# Patient Record
Sex: FEMALE | Race: BLACK OR AFRICAN AMERICAN | Employment: FULL TIME | URBAN - METROPOLITAN AREA
[De-identification: names, ages, dates, MRNs, and addresses within clinical notes are randomized per-mention and may not be internally consistent; named-entity substitution may affect disease eponyms.]

---

## 2021-03-24 ENCOUNTER — EMPLOYEE HEALTH (OUTPATIENT)
Dept: OTHER | Facility: HOSPITAL | Age: 46
End: 2021-03-24
Attending: PREVENTIVE MEDICINE

## 2021-03-24 DIAGNOSIS — Z11.1 SCREENING-PULMONARY TB: Primary | ICD-10-CM

## 2021-03-24 PROCEDURE — 86480 TB TEST CELL IMMUN MEASURE: CPT

## 2021-03-26 LAB
M TB IFN-G CD4+ T-CELLS BLD-ACNC: 0.07 IU/ML
M TB TUBERC IFN-G BLD QL: NEGATIVE
M TB TUBERC IGNF/MITOGEN IGNF CONTROL: >10 IU/ML
QFT TB1 AG MINUS NIL: 0.02 IU/ML
QFT TB2 AG MINUS NIL: 0.03 IU/ML

## 2021-10-23 ENCOUNTER — LAB ENCOUNTER (OUTPATIENT)
Dept: LAB | Age: 46
End: 2021-10-23
Attending: PREVENTIVE MEDICINE
Payer: COMMERCIAL

## 2021-11-05 ENCOUNTER — LAB ENCOUNTER (OUTPATIENT)
Dept: LAB | Age: 46
End: 2021-11-05
Attending: PREVENTIVE MEDICINE
Payer: COMMERCIAL

## 2021-11-12 ENCOUNTER — LAB ENCOUNTER (OUTPATIENT)
Dept: LAB | Age: 46
End: 2021-11-12
Attending: PREVENTIVE MEDICINE
Payer: COMMERCIAL

## 2021-11-17 ENCOUNTER — LAB ENCOUNTER (OUTPATIENT)
Dept: LAB | Age: 46
End: 2021-11-17
Attending: PREVENTIVE MEDICINE
Payer: COMMERCIAL

## 2021-11-26 ENCOUNTER — LAB ENCOUNTER (OUTPATIENT)
Dept: LAB | Age: 46
End: 2021-11-26
Attending: PREVENTIVE MEDICINE
Payer: COMMERCIAL

## 2021-12-01 ENCOUNTER — LAB ENCOUNTER (OUTPATIENT)
Dept: LAB | Age: 46
End: 2021-12-01
Attending: PREVENTIVE MEDICINE
Payer: COMMERCIAL

## 2021-12-10 ENCOUNTER — LAB ENCOUNTER (OUTPATIENT)
Dept: LAB | Age: 46
End: 2021-12-10
Attending: PREVENTIVE MEDICINE
Payer: COMMERCIAL

## 2021-12-18 ENCOUNTER — LAB ENCOUNTER (OUTPATIENT)
Dept: LAB | Age: 46
End: 2021-12-18
Attending: PREVENTIVE MEDICINE
Payer: COMMERCIAL

## 2021-12-24 ENCOUNTER — LAB ENCOUNTER (OUTPATIENT)
Dept: LAB | Age: 46
End: 2021-12-24
Attending: PREVENTIVE MEDICINE
Payer: COMMERCIAL

## 2022-01-03 ENCOUNTER — LAB ENCOUNTER (OUTPATIENT)
Dept: LAB | Age: 47
End: 2022-01-03
Attending: PREVENTIVE MEDICINE
Payer: COMMERCIAL

## 2022-01-05 LAB — SARS-COV-2 RNA RESP QL NAA+PROBE: DETECTED

## 2022-01-07 ENCOUNTER — TELEPHONE (OUTPATIENT)
Dept: INTERNAL MEDICINE CLINIC | Facility: HOSPITAL | Age: 47
End: 2022-01-07

## 2022-01-07 NOTE — TELEPHONE ENCOUNTER
Department: Housekeeping                                 [x] [facility]  []DANISH   [] [address] [place] [address]    Dept Manager/Supervisor/team or clinical lead: Rimma Warren    Position:  [] MD     [] RN     [] Respiratory Therapist     [] PCT     [] PSR      [] Julito Thao     [ you have close contact with someone on your unit while not wearing a mask? (e.g., during meal breaks):  Yes []   No [x]    If yes, who:   Do you share a workspace? Yes [x]   No []       If yes, with whom?  Coworkers  Do you have any family members sick at h positive test or onset of symptoms (day 0)    [x]      On day 5, if asymptomatic or mildly symptomatic (with improving symptoms) may return to work day 6  [x]      On day 5, if symptomatic, call Employee Health for RTW screening  [] RTW immediately, contin

## 2022-04-20 ENCOUNTER — LAB ENCOUNTER (OUTPATIENT)
Dept: LAB | Age: 47
End: 2022-04-20
Attending: PREVENTIVE MEDICINE
Payer: COMMERCIAL

## 2022-04-20 LAB — SARS-COV-2 RNA RESP QL NAA+PROBE: NOT DETECTED

## 2022-04-29 ENCOUNTER — LAB ENCOUNTER (OUTPATIENT)
Dept: LAB | Age: 47
End: 2022-04-29
Attending: PREVENTIVE MEDICINE
Payer: COMMERCIAL

## 2022-04-30 LAB — SARS-COV-2 RNA RESP QL NAA+PROBE: NOT DETECTED

## 2022-05-06 ENCOUNTER — LAB ENCOUNTER (OUTPATIENT)
Dept: LAB | Age: 47
End: 2022-05-06
Attending: PREVENTIVE MEDICINE
Payer: COMMERCIAL

## 2022-05-07 LAB — SARS-COV-2 RNA RESP QL NAA+PROBE: NOT DETECTED

## 2022-05-13 ENCOUNTER — LAB ENCOUNTER (OUTPATIENT)
Dept: LAB | Age: 47
End: 2022-05-13
Attending: PREVENTIVE MEDICINE
Payer: COMMERCIAL

## 2022-05-14 LAB — SARS-COV-2 RNA RESP QL NAA+PROBE: NOT DETECTED

## 2022-05-19 ENCOUNTER — LAB ENCOUNTER (OUTPATIENT)
Dept: LAB | Age: 47
End: 2022-05-19
Attending: PREVENTIVE MEDICINE
Payer: COMMERCIAL

## 2022-05-20 LAB — SARS-COV-2 RNA RESP QL NAA+PROBE: NOT DETECTED

## 2022-05-27 ENCOUNTER — LAB ENCOUNTER (OUTPATIENT)
Dept: LAB | Age: 47
End: 2022-05-27
Attending: PREVENTIVE MEDICINE
Payer: COMMERCIAL

## 2022-05-28 LAB — SARS-COV-2 RNA RESP QL NAA+PROBE: NOT DETECTED

## 2022-06-02 ENCOUNTER — LAB ENCOUNTER (OUTPATIENT)
Dept: LAB | Age: 47
End: 2022-06-02
Attending: PREVENTIVE MEDICINE
Payer: COMMERCIAL

## 2022-06-03 LAB — SARS-COV-2 RNA RESP QL NAA+PROBE: NOT DETECTED

## 2022-06-11 ENCOUNTER — LAB ENCOUNTER (OUTPATIENT)
Dept: LAB | Age: 47
End: 2022-06-11
Attending: PREVENTIVE MEDICINE
Payer: COMMERCIAL

## 2022-06-12 LAB — SARS-COV-2 RNA RESP QL NAA+PROBE: NOT DETECTED

## 2022-06-17 ENCOUNTER — LAB ENCOUNTER (OUTPATIENT)
Dept: LAB | Age: 47
End: 2022-06-17
Attending: PREVENTIVE MEDICINE
Payer: COMMERCIAL

## 2022-06-18 LAB — SARS-COV-2 RNA RESP QL NAA+PROBE: NOT DETECTED

## 2022-06-25 ENCOUNTER — LAB ENCOUNTER (OUTPATIENT)
Dept: LAB | Age: 47
End: 2022-06-25
Attending: PREVENTIVE MEDICINE
Payer: COMMERCIAL

## 2022-06-26 LAB — SARS-COV-2 RNA RESP QL NAA+PROBE: NOT DETECTED

## 2022-06-26 NOTE — PROGRESS NOTES
Covid - not detected Headache History Follow up    How bad have your headaches been since your last visit?   [x] Improved [] Worsened [] No Change    Since your last visit, how many days of headaches have you had? 10/90      What is the lowest intensity (0-10) headache you have experienced in the last 3 months? 2/10    What is the highest intensity (0-10) headache you have experienced in the last 3 months? 7/10    How many of those days of headaches were severe (intensity of 7 or higher)? 2    What other symptoms do you have with the headache? [x] nausea    [x] vomiting   [x] light sensitivity   [x] sound sensitivity    [] smell sensitivity   [] movement sensitivity  [] other:     How long do your headaches last on average (minutes, hours, days)? 12 hours  What is the shortest duration of your headaches? 2 hours  What is the longest duration of your headaches? 1 days    What triggers the headaches? Lack of sleep, stress and weather, surgery, certain foods, thyroid dose/medication    What medication(s) do you take for the headaches? Imitrex, zofran, botox  When do you take the medication (onset, later...)? onset  Does the medication help the headache? Yes   How long does it take to help the headache? 30 min  Does it completely alleviate or reduce the headache? [x] alleviates   [x] reduces-imitrex  If it only reduces the headache, what % relief do you get? 75%  Does the headache come back and when? Yes    Are you taking all of your medications as prescribed/recommended, including over the counter and supplements/vitamins? yes    Are you having any side effects with your prescription or over the counter medications/supplements/vitamins? no    Do you suffer from other kinds of headaches besides your primary type? [] sinus [] tension   [] other:     Have you had any lifestyle changes since the last office visit? No   [] Skipping Meals [] Caffeine Intake [] Exercise   [] Sleeping  [] Smoking  [] Other  Please explain any items checked above:      How many times have you gone to the ED or Urgent Care for headaches in the past 3 months? none    Have you undergone IV therapy for migraine since the last office visit? No   If yes, how many times and when?:     Are you having any sleep problems? No    Are headaches interfering with? [x] eating    [x] thinking    [x]other daily activities.   If yes, please explain: no eating with headaches and headache decrease concentration.    Do you need any medication refills today? No    HIT & MIDAS DONE 08/12/2021

## 2022-06-30 ENCOUNTER — LAB ENCOUNTER (OUTPATIENT)
Dept: LAB | Age: 47
End: 2022-06-30
Attending: PREVENTIVE MEDICINE
Payer: COMMERCIAL

## 2022-06-30 LAB — SARS-COV-2 RNA RESP QL NAA+PROBE: NOT DETECTED

## 2022-07-08 ENCOUNTER — LAB ENCOUNTER (OUTPATIENT)
Dept: LAB | Age: 47
End: 2022-07-08
Attending: PREVENTIVE MEDICINE
Payer: COMMERCIAL

## 2022-07-09 LAB — SARS-COV-2 RNA RESP QL NAA+PROBE: NOT DETECTED

## 2022-07-14 ENCOUNTER — LAB ENCOUNTER (OUTPATIENT)
Dept: LAB | Age: 47
End: 2022-07-14
Attending: PREVENTIVE MEDICINE
Payer: COMMERCIAL

## 2022-07-15 LAB — SARS-COV-2 RNA RESP QL NAA+PROBE: NOT DETECTED

## 2022-07-23 ENCOUNTER — LAB ENCOUNTER (OUTPATIENT)
Dept: LAB | Age: 47
End: 2022-07-23
Attending: PREVENTIVE MEDICINE
Payer: COMMERCIAL

## 2022-07-24 LAB — SARS-COV-2 RNA RESP QL NAA+PROBE: NOT DETECTED

## 2022-07-30 ENCOUNTER — LAB ENCOUNTER (OUTPATIENT)
Dept: LAB | Age: 47
End: 2022-07-30
Attending: PREVENTIVE MEDICINE
Payer: COMMERCIAL

## 2022-07-31 LAB — SARS-COV-2 RNA RESP QL NAA+PROBE: NOT DETECTED

## 2022-08-06 ENCOUNTER — LAB ENCOUNTER (OUTPATIENT)
Dept: LAB | Age: 47
End: 2022-08-06
Attending: PREVENTIVE MEDICINE
Payer: COMMERCIAL

## 2022-08-07 LAB — SARS-COV-2 RNA RESP QL NAA+PROBE: NOT DETECTED

## 2022-08-12 ENCOUNTER — LAB ENCOUNTER (OUTPATIENT)
Dept: LAB | Age: 47
End: 2022-08-12
Attending: PREVENTIVE MEDICINE
Payer: COMMERCIAL

## 2022-08-13 LAB — SARS-COV-2 RNA RESP QL NAA+PROBE: NOT DETECTED

## 2022-08-19 ENCOUNTER — LAB ENCOUNTER (OUTPATIENT)
Dept: LAB | Age: 47
End: 2022-08-19
Attending: PREVENTIVE MEDICINE
Payer: COMMERCIAL

## 2022-08-20 LAB — SARS-COV-2 RNA RESP QL NAA+PROBE: NOT DETECTED

## 2022-08-25 ENCOUNTER — LAB ENCOUNTER (OUTPATIENT)
Dept: LAB | Age: 47
End: 2022-08-25
Attending: PREVENTIVE MEDICINE
Payer: COMMERCIAL

## 2022-08-26 LAB — SARS-COV-2 RNA RESP QL NAA+PROBE: NOT DETECTED

## 2022-09-03 ENCOUNTER — LAB ENCOUNTER (OUTPATIENT)
Dept: LAB | Age: 47
End: 2022-09-03
Attending: PREVENTIVE MEDICINE
Payer: COMMERCIAL

## 2022-09-04 LAB — SARS-COV-2 RNA RESP QL NAA+PROBE: NOT DETECTED

## 2022-09-08 ENCOUNTER — LAB ENCOUNTER (OUTPATIENT)
Dept: LAB | Age: 47
End: 2022-09-08
Attending: PREVENTIVE MEDICINE

## 2022-09-09 LAB — SARS-COV-2 RNA RESP QL NAA+PROBE: NOT DETECTED

## 2022-09-16 ENCOUNTER — LAB ENCOUNTER (OUTPATIENT)
Dept: LAB | Age: 47
End: 2022-09-16
Attending: PREVENTIVE MEDICINE
Payer: COMMERCIAL

## 2022-09-17 LAB — SARS-COV-2 RNA RESP QL NAA+PROBE: NOT DETECTED

## 2022-09-24 ENCOUNTER — LAB ENCOUNTER (OUTPATIENT)
Dept: LAB | Age: 47
End: 2022-09-24
Attending: PREVENTIVE MEDICINE

## 2022-09-25 LAB — SARS-COV-2 RNA RESP QL NAA+PROBE: NOT DETECTED

## 2022-09-27 ENCOUNTER — OCC HEALTH (OUTPATIENT)
Dept: OTHER | Facility: HOSPITAL | Age: 47
End: 2022-09-27
Attending: PREVENTIVE MEDICINE

## 2022-09-27 RX ORDER — PERMETHRIN 50 MG/G
CREAM TOPICAL
Qty: 60 G | Refills: 0 | Status: SHIPPED | OUTPATIENT
Start: 2022-09-27

## 2022-09-30 ENCOUNTER — LAB ENCOUNTER (OUTPATIENT)
Dept: LAB | Age: 47
End: 2022-09-30
Attending: PREVENTIVE MEDICINE
Payer: COMMERCIAL

## 2022-10-01 LAB — SARS-COV-2 RNA RESP QL NAA+PROBE: NOT DETECTED

## 2022-10-06 ENCOUNTER — LAB ENCOUNTER (OUTPATIENT)
Dept: LAB | Age: 47
End: 2022-10-06
Attending: PREVENTIVE MEDICINE
Payer: COMMERCIAL

## 2022-10-07 LAB — SARS-COV-2 RNA RESP QL NAA+PROBE: NOT DETECTED

## 2022-10-15 ENCOUNTER — LAB ENCOUNTER (OUTPATIENT)
Dept: LAB | Age: 47
End: 2022-10-15
Attending: PREVENTIVE MEDICINE

## 2022-10-16 LAB — SARS-COV-2 RNA RESP QL NAA+PROBE: NOT DETECTED

## 2022-10-18 DIAGNOSIS — Z00.00 ROUTINE GENERAL MEDICAL EXAMINATION AT A HEALTH CARE FACILITY: Primary | ICD-10-CM

## 2022-10-20 ENCOUNTER — LAB ENCOUNTER (OUTPATIENT)
Dept: LAB | Age: 47
End: 2022-10-20
Attending: PREVENTIVE MEDICINE

## 2022-10-20 DIAGNOSIS — Z00.00 ROUTINE GENERAL MEDICAL EXAMINATION AT A HEALTH CARE FACILITY: ICD-10-CM

## 2022-10-22 LAB — SARS-COV-2 RNA RESP QL NAA+PROBE: NOT DETECTED

## 2022-11-02 ENCOUNTER — IMMUNIZATION (OUTPATIENT)
Dept: LAB | Facility: HOSPITAL | Age: 47
End: 2022-11-02
Attending: PREVENTIVE MEDICINE
Payer: COMMERCIAL

## 2022-11-02 DIAGNOSIS — Z23 NEED FOR VACCINATION: Primary | ICD-10-CM

## 2022-11-02 PROCEDURE — 90471 IMMUNIZATION ADMIN: CPT
